# Patient Record
Sex: FEMALE | URBAN - METROPOLITAN AREA
[De-identification: names, ages, dates, MRNs, and addresses within clinical notes are randomized per-mention and may not be internally consistent; named-entity substitution may affect disease eponyms.]

---

## 2023-09-26 ENCOUNTER — APPOINTMENT (RX ONLY)
Dept: URBAN - METROPOLITAN AREA CLINIC 333 | Facility: CLINIC | Age: 18
Setting detail: DERMATOLOGY
End: 2023-09-26

## 2023-09-26 DIAGNOSIS — Z41.9 ENCOUNTER FOR PROCEDURE FOR PURPOSES OTHER THAN REMEDYING HEALTH STATE, UNSPECIFIED: ICD-10-CM

## 2023-09-26 PROCEDURE — ? OTHER (COSMETIC)

## 2023-09-26 NOTE — PROCEDURE: OTHER (COSMETIC)
Other (Free Text): Patient came I. With concerns of acne and acne scarring on lower face. Patient was recommended to start with. Hydrafacial and then do a chemical peels to help with further pigmentation from old acne scarring.\\n\\nRecommended to do the Vi Purify peel and receive $50 off her 1st tx.
Detail Level: Zone